# Patient Record
Sex: MALE | Race: WHITE | NOT HISPANIC OR LATINO | Employment: OTHER | URBAN - METROPOLITAN AREA
[De-identification: names, ages, dates, MRNs, and addresses within clinical notes are randomized per-mention and may not be internally consistent; named-entity substitution may affect disease eponyms.]

---

## 2017-03-10 ENCOUNTER — HOSPITAL ENCOUNTER (OUTPATIENT)
Dept: FAMILY MEDICINE CLINIC | Facility: CLINIC | Age: 82
Setting detail: SPECIMEN
Discharge: HOME | End: 2017-03-10
Attending: FAMILY MEDICINE | Admitting: FAMILY MEDICINE

## 2017-03-10 LAB
ALBUMIN SERPL-MCNC: 3.6 G/DL (ref 3.5–4.8)
ALBUMIN/GLOB SERPL: 1.2 {RATIO} (ref 1–1.7)
ALP SERPL-CCNC: 92 IU/L (ref 32–91)
ALT SERPL-CCNC: 16 IU/L (ref 17–63)
ANION GAP SERPL CALC-SCNC: 11.2 MMOL/L (ref 10–20)
AST SERPL-CCNC: 28 IU/L (ref 15–41)
BILIRUB SERPL-MCNC: 0.5 MG/DL (ref 0.3–1.2)
BUN SERPL-MCNC: 16 MG/DL (ref 8–20)
BUN/CREAT SERPL: 17.8 (ref 6.2–20.3)
CALCIUM SERPL-MCNC: 8.8 MG/DL (ref 8.9–10.3)
CHLORIDE SERPL-SCNC: 99 MMOL/L (ref 101–111)
CONV CO2: 27 MMOL/L (ref 22–32)
CONV POLYCHROMASIA IN BLOOD BY LIGHT MICROSCOPY: SLIGHT
CONV ROULEAUX IN BLOOD BY LIGHT MICROSCOPY: SLIGHT
CONV SMEAR REVIEW: (no result)
CONV TOTAL PROTEIN: 6.7 G/DL (ref 6.1–7.9)
CREAT UR-MCNC: 0.9 MG/DL (ref 0.7–1.2)
DIFFERENTIAL METHOD BLD: (no result)
EOSINOPHIL # BLD AUTO: 0.2 10*3/UL (ref 0–0.3)
EOSINOPHIL # BLD AUTO: 1 % (ref 0–3)
ERYTHROCYTE [DISTWIDTH] IN BLOOD BY AUTOMATED COUNT: 18.8 % (ref 11.5–14.5)
GLOBULIN UR ELPH-MCNC: 3.1 G/DL (ref 2.5–3.8)
GLUCOSE SERPL-MCNC: 94 MG/DL (ref 65–99)
HCT VFR BLD AUTO: 31.9 % (ref 40–54)
HGB BLD-MCNC: 10.9 G/DL (ref 14–18)
LYMPHOCYTES # BLD AUTO: 2.6 10*3/UL (ref 0.8–4.8)
LYMPHOCYTES NFR BLD AUTO: 14 % (ref 18–42)
MCH RBC QN AUTO: 35.9 PG (ref 26–32)
MCHC RBC AUTO-ENTMCNC: 34.3 G/DL (ref 32–36)
MCV RBC AUTO: 104.7 FL (ref 80–94)
MONOCYTES # BLD AUTO: 1.3 10*3/UL (ref 0.1–1.3)
MONOCYTES NFR BLD AUTO: 7 % (ref 2–11)
NEUTROPHILS # BLD AUTO: 14.3 10*3/UL (ref 2.3–8.6)
NEUTROPHILS NFR BLD AUTO: 78 % (ref 50–75)
PLATELET # BLD AUTO: 387 10*3/UL (ref 150–450)
PMV BLD AUTO: 8.8 FL (ref 7.4–10.4)
POTASSIUM SERPL-SCNC: 4.2 MMOL/L (ref 3.6–5.1)
RBC # BLD AUTO: 3.04 10*6/UL (ref 4.6–6)
SODIUM SERPL-SCNC: 133 MMOL/L (ref 136–144)
WBC # BLD AUTO: 18.4 10*3/UL (ref 4.5–11.5)

## 2017-06-29 ENCOUNTER — HOSPITAL ENCOUNTER (OUTPATIENT)
Dept: FAMILY MEDICINE CLINIC | Facility: CLINIC | Age: 82
Setting detail: SPECIMEN
Discharge: HOME | End: 2017-06-29
Attending: FAMILY MEDICINE | Admitting: FAMILY MEDICINE

## 2017-06-29 LAB
ALBUMIN SERPL-MCNC: 3.7 G/DL (ref 3.5–4.8)
ALBUMIN/GLOB SERPL: 1.2 {RATIO} (ref 1–1.7)
ALP SERPL-CCNC: 68 IU/L (ref 32–91)
ALT SERPL-CCNC: 16 IU/L (ref 17–63)
ANION GAP SERPL CALC-SCNC: 11 MMOL/L (ref 10–20)
AST SERPL-CCNC: 25 IU/L (ref 15–41)
BASOPHILS # BLD AUTO: 0.1 10*3/UL (ref 0–0.2)
BASOPHILS NFR BLD AUTO: 1 % (ref 0–2)
BILIRUB SERPL-MCNC: 0.6 MG/DL (ref 0.3–1.2)
BUN SERPL-MCNC: 16 MG/DL (ref 8–20)
BUN/CREAT SERPL: 16 (ref 6.2–20.3)
CALCIUM SERPL-MCNC: 9.1 MG/DL (ref 8.9–10.3)
CHLORIDE SERPL-SCNC: 100 MMOL/L (ref 101–111)
CHOLEST SERPL-MCNC: 112 MG/DL
CHOLEST/HDLC SERPL: 3 {RATIO}
CONV CO2: 26 MMOL/L (ref 22–32)
CONV LDL CHOLESTEROL DIRECT: 61 MG/DL (ref 0–100)
CONV TOTAL PROTEIN: 6.7 G/DL (ref 6.1–7.9)
CREAT UR-MCNC: 1 MG/DL (ref 0.7–1.2)
DIFFERENTIAL METHOD BLD: (no result)
EOSINOPHIL # BLD AUTO: 0.2 10*3/UL (ref 0–0.3)
EOSINOPHIL # BLD AUTO: 1 % (ref 0–3)
ERYTHROCYTE [DISTWIDTH] IN BLOOD BY AUTOMATED COUNT: 17.6 % (ref 11.5–14.5)
GLOBULIN UR ELPH-MCNC: 3 G/DL (ref 2.5–3.8)
GLUCOSE SERPL-MCNC: 106 MG/DL (ref 65–99)
HCT VFR BLD AUTO: 30.4 % (ref 40–54)
HDLC SERPL-MCNC: 37 MG/DL
HGB BLD-MCNC: 10.3 G/DL (ref 14–18)
LDLC/HDLC SERPL: 1.6 {RATIO}
LIPID INTERPRETATION: ABNORMAL
LYMPHOCYTES # BLD AUTO: 1.7 10*3/UL (ref 0.8–4.8)
LYMPHOCYTES NFR BLD AUTO: 10 % (ref 18–42)
MCH RBC QN AUTO: 36.6 PG (ref 26–32)
MCHC RBC AUTO-ENTMCNC: 33.8 G/DL (ref 32–36)
MCV RBC AUTO: 108.4 FL (ref 80–94)
MONOCYTES # BLD AUTO: 1.1 10*3/UL (ref 0.1–1.3)
MONOCYTES NFR BLD AUTO: 6 % (ref 2–11)
NEUTROPHILS # BLD AUTO: 13.9 10*3/UL (ref 2.3–8.6)
NEUTROPHILS NFR BLD AUTO: 82 % (ref 50–75)
NRBC BLD AUTO-RTO: 0 /100{WBCS}
NRBC/RBC NFR BLD MANUAL: 0 10*3/UL
PLATELET # BLD AUTO: 366 10*3/UL (ref 150–450)
PMV BLD AUTO: 9 FL (ref 7.4–10.4)
POTASSIUM SERPL-SCNC: 4 MMOL/L (ref 3.6–5.1)
PSA SERPL-MCNC: 5.24 NG/ML (ref 0–4)
RBC # BLD AUTO: 2.8 10*6/UL (ref 4.6–6)
SODIUM SERPL-SCNC: 133 MMOL/L (ref 136–144)
TRIGL SERPL-MCNC: 94 MG/DL
VLDLC SERPL CALC-MCNC: 13.9 MG/DL
WBC # BLD AUTO: 16.9 10*3/UL (ref 4.5–11.5)

## 2019-08-22 ENCOUNTER — OFFICE VISIT (OUTPATIENT)
Dept: FAMILY MEDICINE CLINIC | Facility: CLINIC | Age: 84
End: 2019-08-22

## 2019-08-22 VITALS
DIASTOLIC BLOOD PRESSURE: 62 MMHG | HEIGHT: 66 IN | RESPIRATION RATE: 14 BRPM | WEIGHT: 142 LBS | HEART RATE: 82 BPM | TEMPERATURE: 97.5 F | OXYGEN SATURATION: 97 % | BODY MASS INDEX: 22.82 KG/M2 | SYSTOLIC BLOOD PRESSURE: 108 MMHG

## 2019-08-22 DIAGNOSIS — F33.41 RECURRENT MAJOR DEPRESSIVE DISORDER, IN PARTIAL REMISSION (HCC): ICD-10-CM

## 2019-08-22 DIAGNOSIS — C61 MALIGNANT NEOPLASM OF PROSTATE (HCC): ICD-10-CM

## 2019-08-22 DIAGNOSIS — K21.9 GASTROESOPHAGEAL REFLUX DISEASE WITHOUT ESOPHAGITIS: ICD-10-CM

## 2019-08-22 DIAGNOSIS — E78.5 HYPERLIPIDEMIA, UNSPECIFIED HYPERLIPIDEMIA TYPE: ICD-10-CM

## 2019-08-22 DIAGNOSIS — J30.9 ALLERGIC RHINITIS, UNSPECIFIED SEASONALITY, UNSPECIFIED TRIGGER: Primary | ICD-10-CM

## 2019-08-22 DIAGNOSIS — D46.9 MYELODYSPLASTIC SYNDROME (HCC): ICD-10-CM

## 2019-08-22 PROBLEM — M81.0 OSTEOPOROSIS: Status: ACTIVE | Noted: 2018-04-20

## 2019-08-22 PROCEDURE — 99214 OFFICE O/P EST MOD 30 MIN: CPT | Performed by: FAMILY MEDICINE

## 2019-08-22 RX ORDER — FINASTERIDE 5 MG/1
TABLET, FILM COATED ORAL
COMMUNITY
Start: 2012-01-10 | End: 2020-01-01

## 2019-08-22 RX ORDER — PANTOPRAZOLE SODIUM 40 MG/1
40 TABLET, DELAYED RELEASE ORAL DAILY
COMMUNITY
Start: 2014-08-01

## 2019-08-22 RX ORDER — LORATADINE 10 MG/1
TABLET ORAL
COMMUNITY
Start: 2015-06-15 | End: 2020-01-01

## 2019-08-22 RX ORDER — SIMVASTATIN 10 MG
10 TABLET ORAL DAILY
COMMUNITY
Start: 2014-04-29

## 2019-08-22 RX ORDER — HYDROXYUREA 500 MG/1
500 CAPSULE ORAL DAILY
COMMUNITY
Start: 2017-10-12 | End: 2020-01-01

## 2019-08-22 RX ORDER — SENNOSIDES 8.6 MG
1300 CAPSULE ORAL DAILY
COMMUNITY

## 2019-08-22 RX ORDER — AZELASTINE HCL 205.5 UG/1
2 SPRAY NASAL DAILY
COMMUNITY
End: 2020-01-01

## 2019-08-22 NOTE — PROGRESS NOTES
"Subjective   Den Vinson is a 83 y.o. male.     Chief Complaint   Patient presents with   • Depression     3 MTH F/U   • Hyperlipidemia       HPI  Chief complaint: Allergic rhinitis depression hyper lipidemia osteoporosis gas receptor reflux disease prostate cancer MDS    The patient is a 83-year-old white male comes in for follow-up and maintenance of his current problems which include    1.  Allergic rhinitis-stable-patient on Flonase and antihistamines.  Nasal congestion cough shortness breath or wheezing.    2 hyperlipidemia-stable-patient is on Zocor 20 mg daily.  No myalgias arthralgias.  No nausea or anorexia.      3. gastroesophageal reflux disease-stable-patient on proton pump inhibitor.  Denies dysphagia or heartburn.    4.  Depression-stable-patient on citalopram 20 mg daily.  He denies anxiousness agitation depressed or suicidal thoughts.    5.  Prostate cancer-stable-patient is on Eligard.  There is no evidence of recurrence or metastatic disease.  Patient also takes doxazosin.  6.  Osteoporosis-stable-patient on Prolia and calcium vitamin D.  Patient denied bone pain or fractures.      7.  Myelodysplastic syndrome-stable-patient is on hydroxyurea.  Patient has chronic low-grade anemia.  Patient is transfused for hemoglobin less than 7.  Complains of tiredness and fatigue.    The following portions of the patient's history were reviewed and updated as appropriate: allergies, current medications, past family history, past medical history, past social history, past surgical history and problem list.    Review of Systems    Objective     /62 (BP Location: Right arm, Patient Position: Sitting, Cuff Size: Adult)   Pulse 82   Temp 97.5 °F (36.4 °C) (Oral)   Resp 14   Ht 167.6 cm (66\")   Wt 64.4 kg (142 lb)   SpO2 97%   BMI 22.92 kg/m²     Physical Exam   Constitutional: He is oriented to person, place, and time.   Elderly and frail   HENT:   Head: Normocephalic and atraumatic.   Eyes: " Conjunctivae and EOM are normal. Pupils are equal, round, and reactive to light.   Neck: Normal range of motion. Neck supple.   Cardiovascular: Normal rate, regular rhythm, normal heart sounds and intact distal pulses.   Pulmonary/Chest: Effort normal and breath sounds normal.   Abdominal: Soft. Bowel sounds are normal.   Musculoskeletal: Normal range of motion.   Neurological: He is alert and oriented to person, place, and time.   Skin: Skin is warm and dry.   Psychiatric: He has a normal mood and affect. His behavior is normal.   Nursing note and vitals reviewed.        Assessment/Plan   Den was seen today for depression and hyperlipidemia.    Diagnoses and all orders for this visit:    Allergic rhinitis, unspecified seasonality, unspecified trigger    Hyperlipidemia, unspecified hyperlipidemia type    Gastroesophageal reflux disease without esophagitis    Malignant neoplasm of prostate (CMS/HCC)    Myelodysplastic syndrome (CMS/HCC)    Recurrent major depressive disorder, in partial remission (CMS/HCC)      Patient Instructions   Continue your current medications and treatment.    Have the follow up labs done and call for results.    Follow up in the offe in 3 months.      Say Devries Jr., MD    08/22/19

## 2019-08-22 NOTE — PATIENT INSTRUCTIONS
Continue your current medications and treatment.    Have the follow up labs done and call for results.    Follow up in the offcie in 3 months.

## 2019-10-29 ENCOUNTER — OFFICE VISIT (OUTPATIENT)
Dept: FAMILY MEDICINE CLINIC | Facility: CLINIC | Age: 84
End: 2019-10-29

## 2019-10-29 ENCOUNTER — LAB (OUTPATIENT)
Dept: LAB | Facility: HOSPITAL | Age: 84
End: 2019-10-29

## 2019-10-29 VITALS
HEIGHT: 66 IN | SYSTOLIC BLOOD PRESSURE: 111 MMHG | WEIGHT: 142.8 LBS | BODY MASS INDEX: 22.95 KG/M2 | DIASTOLIC BLOOD PRESSURE: 67 MMHG | TEMPERATURE: 97.7 F | HEART RATE: 79 BPM | RESPIRATION RATE: 16 BRPM | OXYGEN SATURATION: 96 %

## 2019-10-29 DIAGNOSIS — R41.89 COGNITIVE IMPAIRMENT: ICD-10-CM

## 2019-10-29 DIAGNOSIS — Z00.00 ENCOUNTER FOR MEDICARE ANNUAL WELLNESS EXAM: Primary | ICD-10-CM

## 2019-10-29 LAB
ACANTHOCYTES BLD QL SMEAR: ABNORMAL
ANISOCYTOSIS BLD QL: ABNORMAL
BACTERIA UR QL AUTO: ABNORMAL /HPF
BASOPHILS # BLD MANUAL: 0.49 10*3/MM3 (ref 0–0.2)
BASOPHILS NFR BLD AUTO: 1 % (ref 0–1.5)
BILIRUB UR QL STRIP: NEGATIVE
CLARITY UR: ABNORMAL
COLOR UR: ABNORMAL
DACRYOCYTES BLD QL SMEAR: ABNORMAL
DEPRECATED RDW RBC AUTO: 55.4 FL (ref 37–54)
EOSINOPHIL # BLD MANUAL: 1.47 10*3/MM3 (ref 0–0.4)
EOSINOPHIL NFR BLD MANUAL: 3 % (ref 0.3–6.2)
ERYTHROCYTE [DISTWIDTH] IN BLOOD BY AUTOMATED COUNT: 16.7 % (ref 12.3–15.4)
FOLATE SERPL-MCNC: >20 NG/ML (ref 4.78–24.2)
GLUCOSE UR STRIP-MCNC: NEGATIVE MG/DL
HCT VFR BLD AUTO: 30.6 % (ref 37.5–51)
HGB BLD-MCNC: 9.8 G/DL (ref 13–17.7)
HGB UR QL STRIP.AUTO: NEGATIVE
HYALINE CASTS UR QL AUTO: ABNORMAL /LPF
HYPOCHROMIA BLD QL: ABNORMAL
KETONES UR QL STRIP: ABNORMAL
LEUKOCYTE ESTERASE UR QL STRIP.AUTO: NEGATIVE
LYMPHOCYTES # BLD MANUAL: 0.98 10*3/MM3 (ref 0.7–3.1)
LYMPHOCYTES NFR BLD MANUAL: 1 % (ref 5–12)
LYMPHOCYTES NFR BLD MANUAL: 2 % (ref 19.6–45.3)
MCH RBC QN AUTO: 29.3 PG (ref 26.6–33)
MCHC RBC AUTO-ENTMCNC: 32 G/DL (ref 31.5–35.7)
MCV RBC AUTO: 91.6 FL (ref 79–97)
METAMYELOCYTES NFR BLD MANUAL: 1 % (ref 0–0)
MICROCYTES BLD QL: ABNORMAL
MONOCYTES # BLD AUTO: 0.49 10*3/MM3 (ref 0.1–0.9)
MYELOCYTES NFR BLD MANUAL: 1 % (ref 0–0)
NEUTROPHILS # BLD AUTO: 44.43 10*3/MM3 (ref 1.7–7)
NEUTROPHILS NFR BLD MANUAL: 90.9 % (ref 42.7–76)
NEUTS VAC BLD QL SMEAR: ABNORMAL
NITRITE UR QL STRIP: NEGATIVE
OVALOCYTES BLD QL SMEAR: ABNORMAL
PH UR STRIP.AUTO: 5.5 [PH] (ref 5–8)
PLAT MORPH BLD: NORMAL
PLATELET # BLD AUTO: 385 10*3/MM3 (ref 140–450)
PMV BLD AUTO: 10.8 FL (ref 6–12)
POIKILOCYTOSIS BLD QL SMEAR: ABNORMAL
POLYCHROMASIA BLD QL SMEAR: ABNORMAL
PROT UR QL STRIP: ABNORMAL
RBC # BLD AUTO: 3.34 10*6/MM3 (ref 4.14–5.8)
RBC # UR: ABNORMAL /HPF
REF LAB TEST METHOD: ABNORMAL
SP GR UR STRIP: >=1.03 (ref 1–1.03)
SQUAMOUS #/AREA URNS HPF: ABNORMAL /HPF
TOXIC GRANULATION: ABNORMAL
TSH SERPL DL<=0.05 MIU/L-ACNC: 4.88 UIU/ML (ref 0.27–4.2)
UROBILINOGEN UR QL STRIP: ABNORMAL
VIT B12 BLD-MCNC: >2000 PG/ML (ref 211–946)
WBC NRBC COR # BLD: 48.88 10*3/MM3 (ref 3.4–10.8)
WBC UR QL AUTO: ABNORMAL /HPF

## 2019-10-29 PROCEDURE — 82607 VITAMIN B-12: CPT

## 2019-10-29 PROCEDURE — 81001 URINALYSIS AUTO W/SCOPE: CPT

## 2019-10-29 PROCEDURE — 36415 COLL VENOUS BLD VENIPUNCTURE: CPT

## 2019-10-29 PROCEDURE — 85025 COMPLETE CBC W/AUTO DIFF WBC: CPT

## 2019-10-29 PROCEDURE — 85007 BL SMEAR W/DIFF WBC COUNT: CPT

## 2019-10-29 PROCEDURE — G0438 PPPS, INITIAL VISIT: HCPCS | Performed by: FAMILY MEDICINE

## 2019-10-29 PROCEDURE — 84443 ASSAY THYROID STIM HORMONE: CPT

## 2019-10-29 PROCEDURE — 82746 ASSAY OF FOLIC ACID SERUM: CPT

## 2019-10-29 NOTE — PROGRESS NOTES
The ABCs of the Annual Wellness Visit  Initial Medicare Wellness Visit    Chief Complaint   Patient presents with   • Medicare Wellness-Initial Visit       Subjective   History of Present Illness:  Den Vinson is a 84 y.o. male who presents for an Initial Medicare Wellness Visit.    The patient is wife states he has had multiple problems with tiredness and fatigue.  Patient hematology states that his hemoglobin is better and should not be causing tiredness and fatigue.    Patient is wife states that he had increased problems with decision-making and confusion.    HEALTH RISK ASSESSMENT    Recent Hospitalizations:  No hospitalization(s) within the last year.    Current Medical Providers:  Patient Care Team:  Say Devries Jr., MD as PCP - General    Smoking Status:  Social History     Tobacco Use   Smoking Status Never Smoker   Smokeless Tobacco Never Used       Alcohol Consumption:  Social History     Substance and Sexual Activity   Alcohol Use No   • Frequency: Never       Depression Screen:   PHQ-2/PHQ-9 Depression Screening 10/29/2019   Little interest or pleasure in doing things 1   Feeling down, depressed, or hopeless 1   Trouble falling or staying asleep, or sleeping too much 1   Feeling tired or having little energy 2   Poor appetite or overeating 0   Feeling bad about yourself - or that you are a failure or have let yourself or your family down 1   Trouble concentrating on things, such as reading the newspaper or watching television 0   Moving or speaking so slowly that other people could have noticed. Or the opposite - being so fidgety or restless that you have been moving around a lot more than usual 0   Thoughts that you would be better off dead, or of hurting yourself in some way 0   Total Score 6       Fall Risk Screen:  STEADI Fall Risk Assessment has not been completed.    Health Habits and Functional and Cognitive Screening:  Functional & Cognitive Status 10/29/2019   Do you have difficulty  preparing food and eating? No   Do you have difficulty bathing yourself, getting dressed or grooming yourself? No   Do you have difficulty using the toilet? No   Do you have difficulty moving around from place to place? No   Do you have trouble with steps or getting out of a bed or a chair? No   Current Diet Well Balanced Diet   Dental Exam Up to date   Eye Exam Up to date   Exercise (times per week) 3 times per week   Current Exercise Activities Include Walking   Do you need help using the phone?  No   Are you deaf or do you have serious difficulty hearing?  Yes   Do you need help with transportation? No   Do you need help shopping? No   Do you need help preparing meals?  No   Do you need help with housework?  No   Do you need help with laundry? No   Do you need help taking your medications? No   Do you need help managing money? No   Do you ever drive or ride in a car without wearing a seat belt? No   Have you felt unusual stress, anger or loneliness in the last month? No   Who do you live with? Spouse   If you need help, do you have trouble finding someone available to you? No   Have you been bothered in the last four weeks by sexual problems? No   Do you have difficulty concentrating, remembering or making decisions? No         Does the patient have evidence of cognitive impairment? No    Asprin use counseling:Does not need ASA (and currently is not on it)    Age-appropriate Screening Schedule:  Refer to the list below for future screening recommendations based on patient's age, sex and/or medical conditions. Orders for these recommended tests are listed in the plan section. The patient has been provided with a written plan.    Health Maintenance   Topic Date Due   • TDAP/TD VACCINES (1 - Tdap) 09/06/1954   • ZOSTER VACCINE (1 of 2) 09/06/1987   • PNEUMOCOCCAL VACCINES (65+ LOW/MEDIUM RISK) (1 of 2 - PCV13) 09/06/2002   • INFLUENZA VACCINE  08/01/2019   • LIPID PANEL  08/22/2019   • DXA SCAN  08/22/2019           The following portions of the patient's history were reviewed and updated as appropriate: allergies, current medications, past family history, past medical history, past social history, past surgical history and problem list.    Outpatient Medications Prior to Visit   Medication Sig Dispense Refill   • acetaminophen (TYLENOL) 650 MG 8 hr tablet Take 1,300 mg by mouth Daily.     • aspirin 81 MG tablet Take 81 mg by mouth Daily.     • azelastine (ASTEPRO) 0.15 % solution nasal spray 2 sprays into the nostril(s) as directed by provider Daily.     • CITALOPRAM HYDROBROMIDE PO Take 20 mg by mouth Daily.     • degarelix (FIRMAGON) 120 MG chemo injection Inject  under the skin into the appropriate area as directed.     • denosumab (PROLIA) 60 MG/ML solution prefilled syringe syringe Inject 60 mg under the skin into the appropriate area as directed.     • DOXAZOSIN MESYLATE PO Take 8 mg by mouth Daily.     • finasteride (PROSCAR) 5 MG tablet FINASTERIDE 5 MG TABS     • hydroxyurea (HYDREA) 500 MG capsule Take 500 mg by mouth Daily.     • leuprolide (ELIGARD) 22.5 MG kit injection Inject 22.5 mg under the skin into the appropriate area as directed.     • loratadine (CLARITIN) 10 MG tablet CLARITIN 10 MG TABS     • pantoprazole (PROTONIX) 40 MG EC tablet Take 40 mg by mouth Daily.     • simvastatin (ZOCOR) 10 MG tablet Take 10 mg by mouth Daily.       No facility-administered medications prior to visit.        Patient Active Problem List   Diagnosis   • Allergic rhinitis   • Anemia   • Body mass index (BMI) of 22.0-22.9 in adult   • Depression   • Gastroesophageal reflux disease   • Hyperlipidemia   • JAK2 V617F mutation   • Malignant neoplasm of prostate (CMS/HCC)   • Myelodysplastic syndrome (CMS/HCC)   • Osteoporosis   • Vitamin D deficiency       Advanced Care Planning:  Patient has an advance directive - a copy has not been provided. Have asked the patient to send this to us to add to record    Review of  "Systems    Compared to one year ago, the patient feels his physical health is the same.  Compared to one year ago, the patient feels his mental health is the same.    Reviewed chart for potential of high risk medication in the elderly: yes  Reviewed chart for potential of harmful drug interactions in the elderly:yes    Objective         Vitals:    10/29/19 0952   BP: 111/67   BP Location: Right arm   Patient Position: Sitting   Cuff Size: Adult   Pulse: 79   Resp: 16   Temp: 97.7 °F (36.5 °C)   TempSrc: Oral   SpO2: 96%   Weight: 64.8 kg (142 lb 12.8 oz)   Height: 167.6 cm (66\")       Body mass index is 23.05 kg/m².  Discussed the patient's BMI with him. The BMI is in the acceptable range.    Physical Exam   Constitutional: He is oriented to person, place, and time.   Chronically ill appearing; pale   HENT:   Head: Normocephalic and atraumatic.   Right Ear: Decreased hearing is noted.   Left Ear: Decreased hearing is noted.   Eyes: EOM are normal. Pupils are equal, round, and reactive to light.   Neck: Neck supple.   Cardiovascular: Normal rate, regular rhythm, normal heart sounds and intact distal pulses.   Pulmonary/Chest: Effort normal and breath sounds normal.   Abdominal: Soft. Bowel sounds are normal.   Musculoskeletal: Normal range of motion.   Neurological: He is oriented to person, place, and time.   Skin: Skin is warm and dry.   Psychiatric: He has a normal mood and affect. His behavior is normal. Judgment and thought content normal.   Nursing note and vitals reviewed.      Lab Results   Component Value Date     (H) 10/04/2019        Assessment/Plan   Medicare Risks and Personalized Health Plan  CMS Preventative Services Quick Reference  Depression/Dysphoria  Immunizations Discussed/Encouraged (specific immunizations; Td, Influenza, Pneumococcal 23, Prevnar and Shingrix )    The above risks/problems have been discussed with the patient.  Pertinent information has been shared with the patient in the " After Visit Summary.  Follow up plans and orders are seen below in the Assessment/Plan Section.    There are no diagnoses linked to this encounter.  Follow Up:  No Follow-up on file.     An After Visit Summary and PPPS were given to the patient.

## 2019-11-05 ENCOUNTER — APPOINTMENT (OUTPATIENT)
Dept: MRI IMAGING | Facility: HOSPITAL | Age: 84
End: 2019-11-05

## 2019-11-07 ENCOUNTER — HOSPITAL ENCOUNTER (OUTPATIENT)
Dept: MRI IMAGING | Facility: HOSPITAL | Age: 84
Discharge: HOME | End: 2019-11-07
Admitting: FAMILY MEDICINE

## 2019-11-07 DIAGNOSIS — R41.89 COGNITIVE IMPAIRMENT: ICD-10-CM

## 2019-11-07 LAB — CREAT BLDA-MCNC: 1.2 MG/DL (ref 0.6–1.3)

## 2019-11-07 PROCEDURE — A9576 INJ PROHANCE MULTIPACK: HCPCS | Performed by: FAMILY MEDICINE

## 2019-11-07 PROCEDURE — 25010000002 GADOTERIDOL PER 1 ML: Performed by: FAMILY MEDICINE

## 2019-11-07 PROCEDURE — 70553 MRI BRAIN STEM W/O & W/DYE: CPT

## 2019-11-07 PROCEDURE — 82565 ASSAY OF CREATININE: CPT

## 2019-11-07 RX ADMIN — GADOTERIDOL 14 ML: 279.3 INJECTION, SOLUTION INTRAVENOUS at 11:30

## 2019-11-22 ENCOUNTER — OFFICE VISIT (OUTPATIENT)
Dept: FAMILY MEDICINE CLINIC | Facility: CLINIC | Age: 84
End: 2019-11-22

## 2019-11-22 VITALS
OXYGEN SATURATION: 97 % | BODY MASS INDEX: 23.27 KG/M2 | SYSTOLIC BLOOD PRESSURE: 124 MMHG | HEART RATE: 79 BPM | TEMPERATURE: 97.7 F | HEIGHT: 66 IN | DIASTOLIC BLOOD PRESSURE: 71 MMHG | RESPIRATION RATE: 16 BRPM | WEIGHT: 144.8 LBS

## 2019-11-22 DIAGNOSIS — R41.89 COGNITIVE IMPAIRMENT: ICD-10-CM

## 2019-11-22 DIAGNOSIS — E78.5 HYPERLIPIDEMIA, UNSPECIFIED HYPERLIPIDEMIA TYPE: ICD-10-CM

## 2019-11-22 DIAGNOSIS — J30.9 ALLERGIC RHINITIS, UNSPECIFIED SEASONALITY, UNSPECIFIED TRIGGER: ICD-10-CM

## 2019-11-22 DIAGNOSIS — K21.9 GASTROESOPHAGEAL REFLUX DISEASE WITHOUT ESOPHAGITIS: ICD-10-CM

## 2019-11-22 DIAGNOSIS — C61 MALIGNANT NEOPLASM OF PROSTATE (HCC): ICD-10-CM

## 2019-11-22 DIAGNOSIS — F33.41 RECURRENT MAJOR DEPRESSIVE DISORDER, IN PARTIAL REMISSION (HCC): ICD-10-CM

## 2019-11-22 DIAGNOSIS — D46.9 MYELODYSPLASTIC SYNDROME (HCC): Primary | ICD-10-CM

## 2019-11-22 PROCEDURE — 99214 OFFICE O/P EST MOD 30 MIN: CPT | Performed by: FAMILY MEDICINE

## 2019-11-22 NOTE — PROGRESS NOTES
Subjective   Den Vinson is a 84 y.o. male.     Chief Complaint   Patient presents with   • Depression     3 MTH F/U   • Hyperlipidemia       HPI chief complaint: Myelodysplastic syndrome cancer the prostate depression cognitive impairment hyperlipidemia allergic rhinitis gastroesophageal reflux disease    The patient is a 84-year-old white male comes in for follow-up and maintenance of his current problems which include    1.  Myelodysplastic syndrome-gradually deteriorating-patient denied fever chills or night sweats.  Patient is on hydroxyurea.  Patient sees hematology on a regular basis.  This process is slowly progressive.  Patient does have chronic anemia.    2.  Prostate cancer-stable-patient is on injections on a regular basis.  She is also on Proscar 5 mg once a day and Prolia.  There is no evidence that the cancer is progressing or recurring.  He sees urology on a regular basis.    3.  Depression-unchanged-patient taking citalopram 20 mg daily and hydroxyzine for anxiety.  Wife reports that he becomes very anxious when she is not around.  He denied suicidal homicidal thoughts.  He does worry constantly.    4.  Cognitive impairment-deteriorating-patient is currently on Aricept.  Patient cognitive impairment is gradually progressing to the point that he gets lost periodically.  His wife states that he gets very anxious when she is not around.  She states she is finding it difficult to be around all the time.  She was advised that this is a progressive process and some accommodations regarding need to be made for his safety.    5.  Hyperlipidemia-stable-patient is on Zocor 10 mg daily.  Denies myalgias and arthralgias.    6.  Gastroesophageal reflux disease-stable-patient on Protonix 40 mg daily.  Denies dysphagia or heartburn.    7.  Allergic Rhinitis-stable-patient is currently on antihistamines and inhaled anticholinergics.    The following portions of the patient's history were reviewed and updated as  "appropriate: allergies, current medications, past family history, past medical history, past social history, past surgical history and problem list.    Review of Systems    Objective     /71 (BP Location: Right arm, Patient Position: Sitting, Cuff Size: Adult)   Pulse 79   Temp 97.7 °F (36.5 °C) (Oral)   Resp 16   Ht 167.6 cm (66\")   Wt 65.7 kg (144 lb 12.8 oz)   SpO2 97%   BMI 23.37 kg/m²     Physical Exam   Constitutional: He is oriented to person, place, and time.   HENT:   Head: Normocephalic and atraumatic.   Right Ear: Decreased hearing is noted.   Left Ear: Decreased hearing is noted.   Eyes: Conjunctivae and EOM are normal. Pupils are equal, round, and reactive to light.   Neck: Normal range of motion. Neck supple.   Cardiovascular: Normal rate, regular rhythm, normal heart sounds and intact distal pulses.   Pulmonary/Chest: Effort normal and breath sounds normal.   Abdominal: Soft. Bowel sounds are normal.   Musculoskeletal: Normal range of motion.   Neurological: He is alert and oriented to person, place, and time.   Skin: Skin is warm and dry.   Psychiatric: He has a normal mood and affect. His behavior is normal.   Nursing note and vitals reviewed.        Assessment/Plan   Den was seen today for depression and hyperlipidemia.    Diagnoses and all orders for this visit:    Myelodysplastic syndrome (CMS/HCC)    Malignant neoplasm of prostate (CMS/HCC)    Recurrent major depressive disorder, in partial remission (CMS/HCC)    Cognitive impairment    Hyperlipidemia, unspecified hyperlipidemia type    Allergic rhinitis, unspecified seasonality, unspecified trigger    Gastroesophageal reflux disease without esophagitis      Patient Instructions   Continue your current medications and treatment.    Follow up in the office in 3 months.    Laboratory testing at that time.      Say Devries Jr., MD    11/22/19  "

## 2020-01-01 ENCOUNTER — OFFICE VISIT (OUTPATIENT)
Dept: FAMILY MEDICINE CLINIC | Facility: CLINIC | Age: 85
End: 2020-01-01

## 2020-01-01 ENCOUNTER — TELEPHONE (OUTPATIENT)
Dept: FAMILY MEDICINE CLINIC | Facility: CLINIC | Age: 85
End: 2020-01-01

## 2020-01-01 ENCOUNTER — LAB (OUTPATIENT)
Dept: LAB | Facility: HOSPITAL | Age: 85
End: 2020-01-01

## 2020-01-01 ENCOUNTER — HOSPITAL ENCOUNTER (OUTPATIENT)
Dept: NUCLEAR MEDICINE | Facility: HOSPITAL | Age: 85
Discharge: HOME | End: 2020-08-10

## 2020-01-01 ENCOUNTER — TELEMEDICINE (OUTPATIENT)
Dept: FAMILY MEDICINE CLINIC | Facility: CLINIC | Age: 85
End: 2020-01-01

## 2020-01-01 VITALS
RESPIRATION RATE: 20 BRPM | HEART RATE: 90 BPM | OXYGEN SATURATION: 95 % | WEIGHT: 139.4 LBS | HEIGHT: 66 IN | DIASTOLIC BLOOD PRESSURE: 61 MMHG | BODY MASS INDEX: 22.4 KG/M2 | SYSTOLIC BLOOD PRESSURE: 96 MMHG

## 2020-01-01 VITALS
TEMPERATURE: 97.6 F | HEIGHT: 66 IN | SYSTOLIC BLOOD PRESSURE: 97 MMHG | DIASTOLIC BLOOD PRESSURE: 61 MMHG | HEART RATE: 85 BPM | BODY MASS INDEX: 23.05 KG/M2 | OXYGEN SATURATION: 96 % | RESPIRATION RATE: 18 BRPM | WEIGHT: 143.4 LBS

## 2020-01-01 VITALS
HEART RATE: 73 BPM | SYSTOLIC BLOOD PRESSURE: 117 MMHG | RESPIRATION RATE: 20 BRPM | OXYGEN SATURATION: 99 % | BODY MASS INDEX: 21.47 KG/M2 | WEIGHT: 133.6 LBS | HEIGHT: 66 IN | TEMPERATURE: 97 F | DIASTOLIC BLOOD PRESSURE: 71 MMHG

## 2020-01-01 VITALS
OXYGEN SATURATION: 99 % | BODY MASS INDEX: 21.95 KG/M2 | HEART RATE: 88 BPM | RESPIRATION RATE: 20 BRPM | HEIGHT: 66 IN | DIASTOLIC BLOOD PRESSURE: 68 MMHG | SYSTOLIC BLOOD PRESSURE: 112 MMHG | WEIGHT: 136.6 LBS | TEMPERATURE: 97.1 F

## 2020-01-01 VITALS — SYSTOLIC BLOOD PRESSURE: 121 MMHG | TEMPERATURE: 98.6 F | HEART RATE: 73 BPM | DIASTOLIC BLOOD PRESSURE: 55 MMHG

## 2020-01-01 VITALS — HEART RATE: 77 BPM | DIASTOLIC BLOOD PRESSURE: 72 MMHG | TEMPERATURE: 98.6 F | SYSTOLIC BLOOD PRESSURE: 133 MMHG

## 2020-01-01 DIAGNOSIS — R41.89 COGNITIVE IMPAIRMENT: ICD-10-CM

## 2020-01-01 DIAGNOSIS — C61 MALIGNANT NEOPLASM OF PROSTATE (HCC): ICD-10-CM

## 2020-01-01 DIAGNOSIS — K21.9 GASTROESOPHAGEAL REFLUX DISEASE WITHOUT ESOPHAGITIS: ICD-10-CM

## 2020-01-01 DIAGNOSIS — E03.8 SUBCLINICAL HYPOTHYROIDISM: ICD-10-CM

## 2020-01-01 DIAGNOSIS — L03.211 CELLULITIS OF FACE: Primary | ICD-10-CM

## 2020-01-01 DIAGNOSIS — F33.41 RECURRENT MAJOR DEPRESSIVE DISORDER, IN PARTIAL REMISSION (HCC): Primary | ICD-10-CM

## 2020-01-01 DIAGNOSIS — D46.9 MYELODYSPLASTIC SYNDROME (HCC): Primary | ICD-10-CM

## 2020-01-01 DIAGNOSIS — D46.9 MYELODYSPLASTIC SYNDROME (HCC): ICD-10-CM

## 2020-01-01 DIAGNOSIS — F33.41 RECURRENT MAJOR DEPRESSIVE DISORDER, IN PARTIAL REMISSION (HCC): ICD-10-CM

## 2020-01-01 DIAGNOSIS — E03.9 ACQUIRED HYPOTHYROIDISM: ICD-10-CM

## 2020-01-01 DIAGNOSIS — M81.0 OSTEOPOROSIS, UNSPECIFIED OSTEOPOROSIS TYPE, UNSPECIFIED PATHOLOGICAL FRACTURE PRESENCE: ICD-10-CM

## 2020-01-01 DIAGNOSIS — I95.9 HYPOTENSION, UNSPECIFIED HYPOTENSION TYPE: ICD-10-CM

## 2020-01-01 DIAGNOSIS — E03.9 ACQUIRED HYPOTHYROIDISM: Primary | ICD-10-CM

## 2020-01-01 DIAGNOSIS — E78.5 HYPERLIPIDEMIA, UNSPECIFIED HYPERLIPIDEMIA TYPE: ICD-10-CM

## 2020-01-01 DIAGNOSIS — Z00.00 ENCOUNTER FOR ANNUAL WELLNESS EXAM IN MEDICARE PATIENT: ICD-10-CM

## 2020-01-01 LAB
ALBUMIN SERPL-MCNC: 3.6 G/DL (ref 3.5–5.2)
ALBUMIN/GLOB SERPL: 1 G/DL
ALP SERPL-CCNC: 278 U/L (ref 39–117)
ALT SERPL W P-5'-P-CCNC: 11 U/L (ref 1–41)
ANION GAP SERPL CALCULATED.3IONS-SCNC: 10.5 MMOL/L (ref 5–15)
AST SERPL-CCNC: 19 U/L (ref 1–40)
BILIRUB SERPL-MCNC: 0.4 MG/DL (ref 0–1.2)
BUN SERPL-MCNC: 35 MG/DL (ref 8–23)
BUN/CREAT SERPL: 22.7 (ref 7–25)
CALCIUM SPEC-SCNC: 9.3 MG/DL (ref 8.6–10.5)
CHLORIDE SERPL-SCNC: 99 MMOL/L (ref 98–107)
CHOLEST SERPL-MCNC: 113 MG/DL (ref 0–200)
CO2 SERPL-SCNC: 24.5 MMOL/L (ref 22–29)
CREAT SERPL-MCNC: 1.54 MG/DL (ref 0.76–1.27)
DEPRECATED RDW RBC AUTO: 55.7 FL (ref 37–54)
ERYTHROCYTE [DISTWIDTH] IN BLOOD BY AUTOMATED COUNT: 17.1 % (ref 12.3–15.4)
GFR SERPL CREATININE-BSD FRML MDRD: 43 ML/MIN/1.73
GLOBULIN UR ELPH-MCNC: 3.7 GM/DL
GLUCOSE SERPL-MCNC: 145 MG/DL (ref 65–99)
HCT VFR BLD AUTO: 25.8 % (ref 37.5–51)
HDLC SERPL-MCNC: 32 MG/DL (ref 40–60)
HGB BLD-MCNC: 8.3 G/DL (ref 13–17.7)
LDLC SERPL CALC-MCNC: 47 MG/DL (ref 0–100)
LDLC/HDLC SERPL: 1.48 {RATIO}
LYMPHOCYTES # BLD MANUAL: 3.98 10*3/MM3 (ref 0.7–3.1)
LYMPHOCYTES NFR BLD MANUAL: 2 % (ref 5–12)
LYMPHOCYTES NFR BLD MANUAL: 5 % (ref 19.6–45.3)
MCH RBC QN AUTO: 29.1 PG (ref 26.6–33)
MCHC RBC AUTO-ENTMCNC: 32.2 G/DL (ref 31.5–35.7)
MCV RBC AUTO: 90.5 FL (ref 79–97)
METAMYELOCYTES NFR BLD MANUAL: 1 % (ref 0–0)
MONOCYTES # BLD AUTO: 1.59 10*3/MM3 (ref 0.1–0.9)
MYELOCYTES NFR BLD MANUAL: 1 % (ref 0–0)
NEUTROPHILS # BLD AUTO: 72.47 10*3/MM3 (ref 1.7–7)
NEUTROPHILS NFR BLD MANUAL: 91 % (ref 42.7–76)
NRBC SPEC MANUAL: 1 /100 WBC (ref 0–0.2)
PLAT MORPH BLD: NORMAL
PLATELET # BLD AUTO: 388 10*3/MM3 (ref 140–450)
PMV BLD AUTO: 11.6 FL (ref 6–12)
POTASSIUM SERPL-SCNC: 4.6 MMOL/L (ref 3.5–5.2)
PROT SERPL-MCNC: 7.3 G/DL (ref 6–8.5)
RBC # BLD AUTO: 2.85 10*6/MM3 (ref 4.14–5.8)
RBC MORPH BLD: NORMAL
SODIUM SERPL-SCNC: 134 MMOL/L (ref 136–145)
T3FREE SERPL-MCNC: 2.32 PG/ML (ref 2–4.4)
T4 FREE SERPL-MCNC: 1.24 NG/DL (ref 0.93–1.7)
TRIGL SERPL-MCNC: 168 MG/DL (ref 0–150)
TSH SERPL DL<=0.05 MIU/L-ACNC: 3.06 UIU/ML (ref 0.27–4.2)
TSH SERPL DL<=0.05 MIU/L-ACNC: 4.48 UIU/ML (ref 0.27–4.2)
VLDLC SERPL-MCNC: 33.6 MG/DL (ref 5–40)
WBC # BLD AUTO: 79.64 10*3/MM3 (ref 3.4–10.8)
WBC MORPH BLD: NORMAL

## 2020-01-01 PROCEDURE — 78306 BONE IMAGING WHOLE BODY: CPT

## 2020-01-01 PROCEDURE — 85007 BL SMEAR W/DIFF WBC COUNT: CPT

## 2020-01-01 PROCEDURE — 80053 COMPREHEN METABOLIC PANEL: CPT

## 2020-01-01 PROCEDURE — 99214 OFFICE O/P EST MOD 30 MIN: CPT | Performed by: FAMILY MEDICINE

## 2020-01-01 PROCEDURE — 84481 FREE ASSAY (FT-3): CPT

## 2020-01-01 PROCEDURE — 85025 COMPLETE CBC W/AUTO DIFF WBC: CPT

## 2020-01-01 PROCEDURE — G0439 PPPS, SUBSEQ VISIT: HCPCS | Performed by: FAMILY MEDICINE

## 2020-01-01 PROCEDURE — 84443 ASSAY THYROID STIM HORMONE: CPT

## 2020-01-01 PROCEDURE — 84439 ASSAY OF FREE THYROXINE: CPT

## 2020-01-01 PROCEDURE — 99213 OFFICE O/P EST LOW 20 MIN: CPT | Performed by: FAMILY MEDICINE

## 2020-01-01 PROCEDURE — 36415 COLL VENOUS BLD VENIPUNCTURE: CPT

## 2020-01-01 PROCEDURE — A9503 TC99M MEDRONATE: HCPCS | Performed by: FAMILY MEDICINE

## 2020-01-01 PROCEDURE — 80061 LIPID PANEL: CPT

## 2020-01-01 PROCEDURE — 0 TECHNETIUM MEDRONATE KIT: Performed by: FAMILY MEDICINE

## 2020-01-01 RX ORDER — DONEPEZIL HYDROCHLORIDE 5 MG/1
5 TABLET, FILM COATED ORAL
COMMUNITY
Start: 2020-01-01 | End: 2020-01-01

## 2020-01-01 RX ORDER — TC 99M MEDRONATE 20 MG/10ML
25 INJECTION, POWDER, LYOPHILIZED, FOR SOLUTION INTRAVENOUS
Status: DISCONTINUED | OUTPATIENT
Start: 2020-01-01 | End: 2020-01-01 | Stop reason: HOSPADM

## 2020-01-01 RX ORDER — CEPHALEXIN 500 MG/1
500 CAPSULE ORAL 3 TIMES DAILY
Qty: 15 CAPSULE | Refills: 0 | Status: SHIPPED | OUTPATIENT
Start: 2020-01-01 | End: 2020-01-01

## 2020-01-01 RX ORDER — TC 99M MEDRONATE 20 MG/10ML
25 INJECTION, POWDER, LYOPHILIZED, FOR SOLUTION INTRAVENOUS
Status: COMPLETED | OUTPATIENT
Start: 2020-01-01 | End: 2020-01-01

## 2020-01-01 RX ORDER — CEPHALEXIN 500 MG/1
500 CAPSULE ORAL 3 TIMES DAILY
Qty: 30 CAPSULE | Refills: 0 | Status: SHIPPED | OUTPATIENT
Start: 2020-01-01 | End: 2020-01-01 | Stop reason: SDUPTHER

## 2020-01-01 RX ADMIN — TC 99M MEDRONATE 25 MILLICURIE: 20 INJECTION, POWDER, LYOPHILIZED, FOR SOLUTION INTRAVENOUS at 10:30

## 2020-01-15 ENCOUNTER — OFFICE VISIT (OUTPATIENT)
Dept: FAMILY MEDICINE CLINIC | Facility: CLINIC | Age: 85
End: 2020-01-15

## 2020-01-15 VITALS
OXYGEN SATURATION: 99 % | BODY MASS INDEX: 23.18 KG/M2 | SYSTOLIC BLOOD PRESSURE: 113 MMHG | HEIGHT: 66 IN | RESPIRATION RATE: 20 BRPM | HEART RATE: 78 BPM | TEMPERATURE: 97.7 F | DIASTOLIC BLOOD PRESSURE: 70 MMHG | WEIGHT: 144.2 LBS

## 2020-01-15 DIAGNOSIS — R05.9 COUGH: Primary | ICD-10-CM

## 2020-01-15 PROCEDURE — 99212 OFFICE O/P EST SF 10 MIN: CPT | Performed by: FAMILY MEDICINE

## 2020-01-15 NOTE — PROGRESS NOTES
"Subjective   Den Vinson is a 84 y.o. male.     Chief Complaint   Patient presents with   • Wheezing     follow up from [facility]  Chief complaint: Wheezing    The patient is an 84-year-old white male who [facility]  On recent exam the doctor heard some wheezing.  She recommended a chest x-ray.  Patient's did not want to do so.  They wanted to be evaluated by me.  Patient denied fever or chills.  He denied sinus congestion drainage drainage sore throat cough.  He denied nausea or vomiting or diarrhea.        The following portions of the patient's history were reviewed and updated as appropriate: allergies, current medications, past family history, past medical history, past social history, past surgical history and problem list.    Review of Systems    Objective     /70 (BP Location: Left arm, Patient Position: Sitting, Cuff Size: Adult)   Pulse 78   Temp 97.7 °F (36.5 °C) (Oral)   Resp 20   Ht 167.6 cm (66\")   Wt 65.4 kg (144 lb 3.2 oz)   SpO2 99%   BMI 23.27 kg/m²     Physical Exam   Constitutional: He is oriented to person, place, and time. He appears well-developed and well-nourished.   HENT:   Head: Normocephalic and atraumatic.   Right Ear: Decreased hearing is noted.   Left Ear: Decreased hearing is noted.   Eyes: Pupils are equal, round, and reactive to light. Conjunctivae and EOM are normal.   Neck: Normal range of motion. Neck supple.   Cardiovascular: Normal rate, regular rhythm, normal heart sounds and intact distal pulses.   Pulmonary/Chest: Effort normal and breath sounds normal.   Abdominal: Soft. Bowel sounds are normal.   Musculoskeletal: Normal range of motion.   Neurological: He is alert and oriented to person, place, and time.   Skin: Skin is warm and dry.   Psychiatric: He has a normal mood and affect. His behavior is normal.   Nursing note and vitals reviewed.    Patient was advised that on my clinical exam I could not hear any " wheezing or other symptoms of respiratory infection.  Offered to do the testing.  Patient and family opted not to.    Assessment/Plan   Den was seen today for wheezing.    Diagnoses and all orders for this visit:    Cough      Patient Instructions   Continue your currentr medications and treatment.    Call if you develop fever, increased shortness of breath.    Follow up with me at your next appointment.          Say Devries Jr., MD    01/15/20

## 2020-01-15 NOTE — PATIENT INSTRUCTIONS
Continue your currentr medications and treatment.    Call if you develop fever, increased shortness of breath.    Follow up with me at your next appointment.

## 2020-01-16 ENCOUNTER — LAB (OUTPATIENT)
Dept: LAB | Facility: HOSPITAL | Age: 85
End: 2020-01-16

## 2020-01-16 ENCOUNTER — TRANSCRIBE ORDERS (OUTPATIENT)
Dept: ADMINISTRATIVE | Facility: HOSPITAL | Age: 85
End: 2020-01-16

## 2020-01-16 DIAGNOSIS — F03.90 SENILE DEMENTIA, UNCOMPLICATED (HCC): ICD-10-CM

## 2020-01-16 DIAGNOSIS — F03.90 SENILE DEMENTIA, UNCOMPLICATED (HCC): Primary | ICD-10-CM

## 2020-01-16 LAB
CHROMATIN AB SERPL-ACNC: 33.5 IU/ML (ref 0–14)
FOLATE SERPL-MCNC: >20 NG/ML (ref 4.78–24.2)
RPR SER QL: NORMAL
TSH SERPL DL<=0.05 MIU/L-ACNC: 5.56 UIU/ML (ref 0.27–4.2)
VIT B12 BLD-MCNC: >2000 PG/ML (ref 211–946)

## 2020-01-16 PROCEDURE — 84165 PROTEIN E-PHORESIS SERUM: CPT

## 2020-01-16 PROCEDURE — 82607 VITAMIN B-12: CPT

## 2020-01-16 PROCEDURE — 86235 NUCLEAR ANTIGEN ANTIBODY: CPT

## 2020-01-16 PROCEDURE — 86431 RHEUMATOID FACTOR QUANT: CPT

## 2020-01-16 PROCEDURE — 83516 IMMUNOASSAY NONANTIBODY: CPT

## 2020-01-16 PROCEDURE — 86592 SYPHILIS TEST NON-TREP QUAL: CPT

## 2020-01-16 PROCEDURE — 84155 ASSAY OF PROTEIN SERUM: CPT

## 2020-01-16 PROCEDURE — 36415 COLL VENOUS BLD VENIPUNCTURE: CPT

## 2020-01-16 PROCEDURE — 84443 ASSAY THYROID STIM HORMONE: CPT

## 2020-01-16 PROCEDURE — 86225 DNA ANTIBODY NATIVE: CPT

## 2020-01-16 PROCEDURE — 86038 ANTINUCLEAR ANTIBODIES: CPT

## 2020-01-16 PROCEDURE — 83921 ORGANIC ACID SINGLE QUANT: CPT

## 2020-01-16 PROCEDURE — 82746 ASSAY OF FOLIC ACID SERUM: CPT

## 2020-01-17 LAB
ALBUMIN SERPL-MCNC: 3.7 G/DL (ref 2.9–4.4)
ALBUMIN/GLOB SERPL: 1 {RATIO} (ref 0.7–1.7)
ALPHA1 GLOB FLD ELPH-MCNC: 0.3 G/DL (ref 0–0.4)
ALPHA2 GLOB SERPL ELPH-MCNC: 0.8 G/DL (ref 0.4–1)
ANA SER QL: POSITIVE
B-GLOBULIN SERPL ELPH-MCNC: 0.9 G/DL (ref 0.7–1.3)
GAMMA GLOB SERPL ELPH-MCNC: 1.7 G/DL (ref 0.4–1.8)
GLOBULIN SER CALC-MCNC: 3.7 G/DL (ref 2.2–3.9)
Lab: NORMAL
M-SPIKE: NORMAL G/DL
PROT SERPL-MCNC: 7.4 G/DL (ref 6–8.5)

## 2020-01-20 LAB
CENTROMERE B AB SER-ACNC: <0.2 AI (ref 0–0.9)
CHROMATIN AB SERPL-ACNC: <0.2 AI (ref 0–0.9)
DSDNA AB SER-ACNC: 1 IU/ML (ref 0–9)
ENA JO1 AB SER-ACNC: <0.2 AI (ref 0–0.9)
ENA RNP AB SER-ACNC: <0.2 AI (ref 0–0.9)
ENA SCL70 AB SER-ACNC: 0.6 AI (ref 0–0.9)
ENA SM AB SER-ACNC: <0.2 AI (ref 0–0.9)
ENA SS-A AB SER-ACNC: <0.2 AI (ref 0–0.9)
ENA SS-B AB SER-ACNC: 0.3 AI (ref 0–0.9)
Lab: NORMAL
RIBOSOMAL P AB SER-ACNC: <0.2 AI (ref 0–0.9)
RIBOSOMAL P AB SER-ACNC: <0.2 AI (ref 0–0.9)

## 2020-01-21 LAB
Lab: ABNORMAL
METHYLMALONATE SERPL-SCNC: 404 NMOL/L (ref 0–378)

## 2020-02-20 NOTE — TELEPHONE ENCOUNTER
TSH level came back as 5.56 and Dr. Roy would like you to manage this, their office is faxing his lab results over to us

## 2020-02-27 PROBLEM — E03.8 SUBCLINICAL HYPOTHYROIDISM: Status: ACTIVE | Noted: 2020-01-01

## 2020-02-27 PROBLEM — R05.9 COUGH: Status: RESOLVED | Noted: 2020-01-15 | Resolved: 2020-01-01

## 2020-02-27 NOTE — PROGRESS NOTES
Subjective   Den Vinson is a 84 y.o. male.     Chief Complaint   Patient presents with   • Depression     3 MTH F/U   • Hyperlipidemia       HPI  Chief complaint: Subclinical hypothyroidism depression hyperlipidemia gastroesophageal reflux disease prostate cancer    The patient is an 84-year-old white male comes in for follow-up and maintenance of his current problems which include    1.  Subclinical hypothyroidism-new-patient is been found to have a mildly elevated TSH.  Patient does not have other signs or symptoms of hypothyroidism.  Patient does have dementia.  Patient already takes several medications.  I recommended a T3 and a T4 prior to starting thyroid replacement therapy.    2.  Hyperlipidemia-stable-patient on Zocor 10 mg daily.  He denied anxiousness agitation depressed or suicidal thoughts.    3.  Dementia-stable-patient has been evaluated by neurology.  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  Is been placed on Aricept 5 mg daily.  Wife states that his dementia is about the same she is not noted any improvement.    4.  Allergic rhinitis-stable-patient is currently on Claritin and Astepro.  Denies sinus congestion drainage cough shortness of breath or wheezing.    5.  Prostate cancer-stable-patient is currently on Eligard .  No evidence of metastatic disease.    6.  MyeloDysplastic syndrome-stable-patient on hydroxyurea.  Hemoglobin hematocrit and white count are stable.  He does have chronic anemia.    7.  Osateoporosis-stable-patient is currently on Prolia and calcium vitamin D.  Denied bone pain or fractures.        The following portions of the patient's history were reviewed and updated as appropriate: allergies, current medications, past family history, past medical history, past social history, past surgical history and problem list.    Review of Systems    Objective     BP 97/61 (BP Location: Left arm, Patient Position: Sitting, Cuff Size: Adult)   Pulse 85   Temp 97.6 °F  "(36.4 °C) (Oral)   Resp 18   Ht 167.6 cm (66\")   Wt 65 kg (143 lb 6.4 oz)   SpO2 96%   BMI 23.15 kg/m²     Physical Exam   Constitutional: He is oriented to person, place, and time. He appears well-developed and well-nourished.   HENT:   Head: Normocephalic and atraumatic.   Right Ear: Decreased hearing is noted.   Left Ear: Decreased hearing is noted.   Eyes: Pupils are equal, round, and reactive to light. Conjunctivae and EOM are normal.   Neck: Normal range of motion. Neck supple.   Cardiovascular: Normal rate, regular rhythm, normal heart sounds and intact distal pulses.   Pulmonary/Chest: Effort normal and breath sounds normal.   Abdominal: Soft. Bowel sounds are normal.   Musculoskeletal: Normal range of motion.   Neurological: He is alert and oriented to person, place, and time.   Skin: Skin is warm and dry.   Psychiatric: He has a normal mood and affect. His behavior is normal.   Nursing note and vitals reviewed.        Assessment/Plan   Den was seen today for depression and hyperlipidemia.    Diagnoses and all orders for this visit:    Acquired hypothyroidism  -     Lipid Panel; Future  -     TSH; Future  -     T3, free; Future  -     T4, free; Future    Hyperlipidemia, unspecified hyperlipidemia type    Subclinical hypothyroidism    Malignant neoplasm of prostate (CMS/HCC)    Myelodysplastic syndrome (CMS/HCC)    Cognitive impairment      There are no Patient Instructions on file for this visit.    Say Devries Jr., MD    02/27/20  "

## 2020-04-13 PROBLEM — L03.211 CELLULITIS OF FACE: Status: ACTIVE | Noted: 2020-01-01

## 2020-04-13 NOTE — PATIENT INSTRUCTIONS
Take the anti-biotics as prescribed.    Continue your other medication and treatment.    Follow up in the office in 1 week.

## 2020-04-13 NOTE — PROGRESS NOTES
Subjective   Den Vinson is a 84 y.o. male.     Chief Complaint   Patient presents with   • Cellulitis       HPI  Chief complaint: Facial redness    The patient is an 84-year-old white male who was seen by video visit today.    The patient wife contacted me.  The patient is just recovering from a fever blister involving the left side of his mouth.  She states it was a bad episode.  She has been treating this with Ambigen and she thinks that it is finally resolving.  The patient however is developed redness in the left facial area from the corner of his mouth under his eye.  He denied fever or chills.  He denied pain.  She was wondering whether the patient had cellulitis.    The patient has multiple medical problems including chronic lymphocytic leukemia.        The following portions of the patient's history were reviewed and updated as appropriate: allergies, current medications, past family history, past medical history, past social history, past surgical history and problem list.    Review of Systems   Constitutional: Negative for chills and fever.   HENT: Negative for congestion, sinus pressure and swollen glands.         Redness of the left face from the left lip to the left infraorbital area   Eyes: Negative for blurred vision and pain.   Respiratory: Negative for cough and shortness of breath.    Cardiovascular: Negative for chest pain and leg swelling.   Gastrointestinal: Negative for abdominal pain, nausea and indigestion.   Endocrine: Negative for cold intolerance, heat intolerance, polydipsia, polyphagia and polyuria.   Skin: Negative for dry skin, rash and bruise.   Neurological: Negative for dizziness, syncope and headache.   Psychiatric/Behavioral: Negative for dysphoric mood and stress.       Objective     /72   Pulse 77   Temp 98.6 °F (37 °C)     Physical Exam   Constitutional: He appears well-developed and well-nourished.   HENT:   Redness of the left facial area in the maxillary sinus are  are   Nursing note and vitals reviewed.        Assessment/Plan   Den was seen today for cellulitis.    Diagnoses and all orders for this visit:    Cellulitis of face-the patient and his wife are advised that he may have cellulitis involving the left facial area.  I recommended a trial of Keflex. They are to follow-up with me in 1 week or sooner if needed.      Patient Instructions   Take the anti-biotics as prescribed.    Continue your other medication and treatment.    Follow up in the office in 1 week.      Say Devries Jr., MD    04/13/20

## 2020-04-22 NOTE — PROGRESS NOTES
Subjective   Den Vinson is a 84 y.o. male.     Chief Complaint   Patient presents with   • Cellulitis       HPI  Cheif complaint : Cellulitis, MDS    The patient is an 84-year-old white male was seen by video visit today.  The patient agreed to be seen by video visit.    The patient was seen in follow-up and maintenance of his current problems which include    1.  Cellulitis of the left face-improved-the patient was seen by me 1 to 2 weeks ago because of cellulitis of the left face that developed following a cold sore.  The patient was placed on Keflex.  Patient states he feels significantly better.  He states the redness on his face is almost totally gone.  He still has a small amount of redness left.  There is no fever chills.  Patient does not have tenderness.    2.  Myelodysplastic syndrome-stable-patient has history of mild dysplastic syndrome.  He has chronic anemia.  Patient is at risk for bacterial infections as result of this.     3.  Prostate cancer-stable-patient also has a history of prostate cancer and is on Firmagon injections.  The prostate cancer is controlled.  Is no evidence of metastatic disease or recurrence.        The following portions of the patient's history were reviewed and updated as appropriate: allergies, current medications, past family history, past medical history, past social history, past surgical history and problem list.    Review of Systems   Constitutional: Negative for chills and fever.   HENT: Negative for congestion, sinus pressure and swollen glands.    Eyes: Negative for blurred vision and pain.   Respiratory: Negative for cough and shortness of breath.    Cardiovascular: Negative for chest pain and leg swelling.   Gastrointestinal: Negative for abdominal pain, nausea and indigestion.   Endocrine: Negative for cold intolerance, heat intolerance, polydipsia, polyphagia and polyuria.   Skin: Negative for dry skin, rash and bruise.   Neurological: Positive for memory  problem. Negative for dizziness, syncope and headache.   Psychiatric/Behavioral: Negative for dysphoric mood and stress.       Objective     /55   Pulse 73   Temp 98.6 °F (37 °C)     Physical Exam   Constitutional: He appears well-developed and well-nourished.   HENT:   No erythema of the left face   Nursing note and vitals reviewed.        Assessment/Plan   Den was seen today for cellulitis.    Diagnoses and all orders for this visit:    Cellulitis of face    Myelodysplastic syndrome (CMS/HCC)    Malignant neoplasm of prostate (CMS/HCC)      Patient Instructions   Continue your current medications.      Take Keflex for another 5 days.    Follow up in the office in 3 months.      Say Devries Jr., MD    04/22/20

## 2020-04-22 NOTE — PATIENT INSTRUCTIONS
Continue your current medications.      Take Keflex for another 5 days.    Follow up in the office in 3 months.

## 2020-05-27 NOTE — TELEPHONE ENCOUNTER
I spoke to the patient's wife.  He has a skin rash on his legs.  He is not febrile.  He has an appointment with dermatology tomorrow.  He will have dermatology look at it.

## 2020-07-17 PROBLEM — F33.41 RECURRENT MAJOR DEPRESSIVE DISORDER, IN PARTIAL REMISSION (HCC): Status: ACTIVE | Noted: 2020-01-01

## 2020-07-17 PROBLEM — L03.211 CELLULITIS OF FACE: Status: RESOLVED | Noted: 2020-01-01 | Resolved: 2020-01-01

## 2020-07-17 NOTE — PATIENT INSTRUCTIONS
Stop the Aricept and the Doxazocin.    Have the follow up labs done and call for results.    Follow up in the office in 2 weeks.

## 2020-07-17 NOTE — PROGRESS NOTES
Subjective   Den Vinson is a 84 y.o. male.     Chief Complaint   Patient presents with   • Hypotension   • Depression   • Hyperlipidemia       HPI  Chief complaint: Hyperlipidemia hypothyroidism prostate cancer myelodysplastic syndrome    The patient is an 84-year-old white male comes in for follow-up and maintenance of his current problems which include    1.  Prostate cancer-stable-patient is currently on Eligard and Firmagon.  Patient got an injection 2 weeks ago.  Patient's wife states that he has been tired and fatigued ever since then.  She states that he sleeps all the time.  She states that when he has had these injections in the past he gets tiredness and fatigue until it is time for another 1.  He states that just prior to getting the follow-up injections his tiredness and fatigue improved.  He denied fever chills.  The patient is on doxazosin for prostate type symptoms.  Patient's wife states it is not helping.  She states he has frequent urination.    2.  Hyperlipidemia-stable-the patient is on Zocor 10 mg once a day.  He denies myalgias and arthralgias.  No nausea or anorexia.    3.  Depression-stable-patient on citalopram 20 mg daily.  Denies anxiousness agitation depressed or suicidal thoughts.    4.  Osteoporosis-stable-patient on Prolia.  He denied bone pain or fractures.    5.  Subclinical hypothyroidism-stable-patient on no no medications.  He does complain of some tiredness and fatigue.    6.  Gastroesophageal reflux disease-stable-patient is currently on Protonix.  He denies dysphagia or heartburn.    7.  Myelodysplastic syndrome/chronic lymphocytic leukemia-stable-patient has history of anemia and elevated white blood cell count.  His last hemoglobin 9.7.  Is not had a CBC since then.  Patient's wife states that he is having difficulty with tiredness and fatigue.    8. Dementia-deteriorated-patient on Aricept 5 mg daily.  Patient wife states that this is not helping his dementia.  I  "recommended stopping the medication.        The following portions of the patient's history were reviewed and updated as appropriate: allergies, current medications, past family history, past medical history, past social history, past surgical history and problem list.    Review of Systems    Objective     BP 96/61 (BP Location: Left arm, Patient Position: Sitting, Cuff Size: Adult)   Pulse 90   Resp 20   Ht 167.6 cm (66\")   Wt 63.2 kg (139 lb 6.4 oz)   SpO2 95%   BMI 22.50 kg/m²     Physical Exam   Constitutional: He is oriented to person, place, and time.   HENT:   Head: Normocephalic and atraumatic.   Eyes: Pupils are equal, round, and reactive to light. Conjunctivae and EOM are normal.   Neck: Normal range of motion. Neck supple.   Cardiovascular: Normal rate, regular rhythm, normal heart sounds and intact distal pulses.   Pulmonary/Chest: Effort normal and breath sounds normal.   Abdominal: Soft. Bowel sounds are normal.   Musculoskeletal: Normal range of motion.   Neurological: He is alert and oriented to person, place, and time.   Skin: Skin is warm and dry.   Psychiatric: He has a normal mood and affect. His behavior is normal.   Nursing note and vitals reviewed.        Assessment/Plan   Den was seen today for hypotension, depression and hyperlipidemia.    Diagnoses and all orders for this visit:    Myelodysplastic syndrome (CMS/HCC)  -     CBC & Differential; Future  -     Comprehensive Metabolic Panel; Future    Recurrent major depressive disorder, in partial remission (CMS/HCC)    Subclinical hypothyroidism  -     TSH; Future    Hyperlipidemia, unspecified hyperlipidemia type    Gastroesophageal reflux disease without esophagitis    Osteoporosis, unspecified osteoporosis type, unspecified pathological fracture presence    Malignant neoplasm of prostate (CMS/HCC)    Cognitive impairment      Patient Instructions   Stop the Aricept and the Doxazocin.    Have the follow up labs done and call for " results.    Follow up in the office in 2 weeks.      Say Devries Jr., MD    07/17/20

## 2020-07-31 NOTE — PROGRESS NOTES
"Subjective   Den Vinson is a 84 y.o. male.     Chief Complaint   Patient presents with   • Hypotension     2 week follow up   • Anemia   • Prostate Cancer       HPI  Chief complaint: Hypertension prostate cancer myelodysplastic syndrome    The patient is an 84-year-old white male who comes in for follow-up and maintenance of his current problems which include    1.  Hypotension-improved-patient was seen by me recently because of weakness.  This was found to be hypotensive.  His doxazosin and Aricept were stopped.  Patient states he feels better.  His blood pressure is improved.    2.  Myelodysplastic syndrome-stable-patient has a history of myelodysplastic syndrome.  The patient is not currently on medications.  Patient was found to have elevated white count worsening anemia recently.  He has been seen since then by his hematologist.  Bone marrow was done patient had an ultrasound of his spleen.  Results are pending.    3.  Prostate cancer-stable-patient has prostate cancer.  He is receiving Eligard.  He is due for a bone scan per request of his urologist.        The following portions of the patient's history were reviewed and updated as appropriate: allergies, current medications, past family history, past medical history, past social history, past surgical history and problem list.    Review of Systems    Objective     /71 (BP Location: Left arm, Patient Position: Sitting, Cuff Size: Adult)   Pulse 73   Temp 97 °F (36.1 °C) (Infrared)   Resp 20   Ht 167.6 cm (66\")   Wt 60.6 kg (133 lb 9.6 oz)   SpO2 99%   BMI 21.56 kg/m²     Physical Exam   Constitutional: He is oriented to person, place, and time.   HENT:   Head: Normocephalic and atraumatic.   Eyes: Pupils are equal, round, and reactive to light. Conjunctivae and EOM are normal.   Neck: Normal range of motion. Neck supple.   Cardiovascular: Normal rate, regular rhythm, normal heart sounds and intact distal pulses.   Pulmonary/Chest: Effort " normal and breath sounds normal.   Abdominal: Soft. Bowel sounds are normal.   Musculoskeletal: Normal range of motion.   Lymphadenopathy:        Head (right side): No submental, no submandibular, no tonsillar, no preauricular, no posterior auricular and no occipital adenopathy present.        Head (left side): No submental, no submandibular, no tonsillar, no preauricular, no posterior auricular and no occipital adenopathy present.     He has no cervical adenopathy.        Right cervical: No superficial cervical, no deep cervical and no posterior cervical adenopathy present.       Left cervical: No superficial cervical, no deep cervical and no posterior cervical adenopathy present.     He has no axillary adenopathy.        Right axillary: No pectoral and no lateral adenopathy present.        Left axillary: No pectoral and no lateral adenopathy present.       Right: No inguinal, no supraclavicular and no epitrochlear adenopathy present.        Left: No inguinal, no supraclavicular and no epitrochlear adenopathy present.   Neurological: He is alert and oriented to person, place, and time.   Skin: Skin is warm and dry.   Psychiatric: He has a normal mood and affect. His behavior is normal.   Nursing note and vitals reviewed.        Assessment/Plan   Den was seen today for hypotension, anemia and prostate cancer.    Diagnoses and all orders for this visit:    Myelodysplastic syndrome (CMS/HCC)    Hypotension, unspecified hypotension type    Malignant neoplasm of prostate (CMS/HCC)  -     NM Bone Scan 3 Ph Whole Body; Future      Patient Instructions   Continue your current medications and treatment.    Have the follow up bone scan.    Follow up in the office in 3 months.      Say Devries Jr., MD    07/31/20

## 2020-07-31 NOTE — PATIENT INSTRUCTIONS
Continue your current medications and treatment.    Have the follow up bone scan.    Follow up in the office in 3 months.

## 2020-11-02 NOTE — PROGRESS NOTES
The ABCs of the Annual Wellness Visit  Subsequent Medicare Wellness Visit    Chief Complaint   Patient presents with   • Medicare Wellness-subsequent   • Hyperlipidemia     3 month followup   • Hypothyroidism   • Depression   • Prostate Cancer   • Anemia       Subjective   History of Present Illness:  Den Vinson is a 85 y.o. male who presents for a Subsequent Medicare Wellness Visit.    HEALTH RISK ASSESSMENT    Recent Hospitalizations:  No hospitalization(s) within the last year.    Current Medical Providers:  Patient Care Team:  Say Devries Jr., MD as PCP - General    Smoking Status:  Social History     Tobacco Use   Smoking Status Never Smoker   Smokeless Tobacco Never Used       Alcohol Consumption:  Social History     Substance and Sexual Activity   Alcohol Use No   • Frequency: Never       Depression Screen:   PHQ-2/PHQ-9 Depression Screening 11/2/2020   Little interest or pleasure in doing things 1   Feeling down, depressed, or hopeless 0   Trouble falling or staying asleep, or sleeping too much -   Feeling tired or having little energy -   Poor appetite or overeating -   Feeling bad about yourself - or that you are a failure or have let yourself or your family down -   Trouble concentrating on things, such as reading the newspaper or watching television -   Moving or speaking so slowly that other people could have noticed. Or the opposite - being so fidgety or restless that you have been moving around a lot more than usual -   Thoughts that you would be better off dead, or of hurting yourself in some way -   Total Score 1       Fall Risk Screen:  STEADI Fall Risk Assessment was completed, and patient is at MODERATE risk for falls. Assessment completed on:11/2/2020    Health Habits and Functional and Cognitive Screening:  Functional & Cognitive Status 11/2/2020   Do you have difficulty preparing food and eating? No   Do you have difficulty bathing yourself, getting dressed or grooming yourself? No    Do you have difficulty using the toilet? No   Do you have difficulty moving around from place to place? No   Do you have trouble with steps or getting out of a bed or a chair? No   Current Diet Well Balanced Diet   Dental Exam Up to date   Eye Exam Up to date   Exercise (times per week) 0 times per week   Current Exercise Activities Include None   Do you need help using the phone?  No   Are you deaf or do you have serious difficulty hearing?  Yes   Do you need help with transportation? No   Do you need help shopping? No   Do you need help preparing meals?  No   Do you need help with housework?  No   Do you need help with laundry? No   Do you need help taking your medications? No   Do you need help managing money? No   Do you ever drive or ride in a car without wearing a seat belt? No   Have you felt unusual stress, anger or loneliness in the last month? No   Who do you live with? Spouse   If you need help, do you have trouble finding someone available to you? No   Have you been bothered in the last four weeks by sexual problems? No   Do you have difficulty concentrating, remembering or making decisions? Yes         Does the patient have evidence of cognitive impairment? No    Asprin use counseling:Taking ASA appropriately as indicated    Age-appropriate Screening Schedule:  Refer to the list below for future screening recommendations based on patient's age, sex and/or medical conditions. Orders for these recommended tests are listed in the plan section. The patient has been provided with a written plan.    Health Maintenance   Topic Date Due   • DXA SCAN  08/22/2019   • INFLUENZA VACCINE  08/01/2020   • TDAP/TD VACCINES (1 - Tdap) 02/27/2021 (Originally 9/6/1954)   • LIPID PANEL  02/27/2021   • ZOSTER VACCINE  Discontinued          The following portions of the patient's history were reviewed and updated as appropriate: allergies, current medications, past family history, past medical history, past social history,  past surgical history and problem list.    Outpatient Medications Prior to Visit   Medication Sig Dispense Refill   • acetaminophen (TYLENOL) 650 MG 8 hr tablet Take 1,300 mg by mouth Daily.     • aspirin 81 MG tablet Take 81 mg by mouth Daily.     • CITALOPRAM HYDROBROMIDE PO Take 20 mg by mouth Daily.     • denosumab (PROLIA) 60 MG/ML solution prefilled syringe syringe Inject 60 mg under the skin into the appropriate area as directed.     • pantoprazole (PROTONIX) 40 MG EC tablet Take 40 mg by mouth Daily.     • simvastatin (ZOCOR) 10 MG tablet Take 10 mg by mouth Daily.     • degarelix (FIRMAGON) 120 MG chemo injection Inject  under the skin into the appropriate area as directed.     • leuprolide (ELIGARD) 22.5 MG kit injection Inject 22.5 mg under the skin into the appropriate area as directed.       No facility-administered medications prior to visit.        Patient Active Problem List   Diagnosis   • Allergic rhinitis   • Body mass index (BMI) of 22.0-22.9 in adult   • Gastroesophageal reflux disease   • Hyperlipidemia   • JAK2 V617F mutation   • Malignant neoplasm of prostate (CMS/HCC)   • Myelodysplastic syndrome (CMS/HCC)   • Osteoporosis   • Vitamin D deficiency   • Cognitive impairment   • Subclinical hypothyroidism   • Recurrent major depressive disorder, in partial remission (CMS/HCC)       Advanced Care Planning:  ACP discussion was held with the patient during this visit. Patient has an advance directive in EMR which is still valid.     Review of Systems    Compared to one year ago, the patient feels his physical health is worse.  Compared to one year ago, the patient feels his mental health is worse.    Reviewed chart for potential of high risk medication in the elderly: yes  Reviewed chart for potential of harmful drug interactions in the elderly:yes    Objective         Vitals:    11/02/20 1045   BP: 112/68   BP Location: Right arm   Patient Position: Sitting   Cuff Size: Adult   Pulse: 88   Resp:  "20   Temp: 97.1 °F (36.2 °C)   TempSrc: Infrared   SpO2: 99%   Weight: 62 kg (136 lb 9.6 oz)   Height: 167.6 cm (66\")       Body mass index is 22.05 kg/m².  Discussed the patient's BMI with him. The BMI is in the acceptable range.    Physical Exam  Vitals signs and nursing note reviewed.   Constitutional:       Appearance: He is well-developed and normal weight.   HENT:      Head: Normocephalic and atraumatic.      Nose: Nose normal.      Mouth/Throat:      Mouth: Mucous membranes are moist.      Pharynx: Oropharynx is clear.   Eyes:      Extraocular Movements: Extraocular movements intact.      Conjunctiva/sclera: Conjunctivae normal.      Pupils: Pupils are equal, round, and reactive to light.   Neck:      Musculoskeletal: Normal range of motion and neck supple.   Cardiovascular:      Rate and Rhythm: Normal rate and regular rhythm.      Heart sounds: Normal heart sounds.   Pulmonary:      Effort: Pulmonary effort is normal.      Breath sounds: Normal breath sounds.   Abdominal:      General: Abdomen is flat. Bowel sounds are normal.      Palpations: Abdomen is soft.   Musculoskeletal: Normal range of motion.   Skin:     General: Skin is warm and dry.   Neurological:      Mental Status: He is alert and oriented to person, place, and time.   Psychiatric:         Behavior: Behavior normal.         Thought Content: Thought content normal.         Judgment: Judgment normal.               Assessment/Plan   Medicare Risks and Personalized Health Plan  CMS Preventative Services Quick Reference  Advance Directive Discussion  Immunizations Discussed/Encouraged (specific immunizations; Td, Influenza, Pneumococcal 23 and Shingrix )    The above risks/problems have been discussed with the patient.  Pertinent information has been shared with the patient in the After Visit Summary.  Follow up plans and orders are seen below in the Assessment/Plan Section.    Diagnoses and all orders for this visit:    1. Encounter for annual " wellness exam in Medicare patient (Primary)    2. Hyperlipidemia, unspecified hyperlipidemia type    3. Gastroesophageal reflux disease without esophagitis    4. Osteoporosis, unspecified osteoporosis type, unspecified pathological fracture presence  -     DEXA Bone Density Axial    5. Malignant neoplasm of prostate (CMS/HCC)    6. Myelodysplastic syndrome (CMS/HCC)    7. Cognitive impairment    8. Recurrent major depressive disorder, in partial remission (CMS/HCC)      Follow Up:  Return in about 3 months (around 2/2/2021) for Recheck.     An After Visit Summary and PPPS were given to the patient.

## 2020-11-02 NOTE — PROGRESS NOTES
Subjective   Den Vinson is a 85 y.o. male.     Chief Complaint   Patient presents with   • Medicare Wellness-subsequent   • Hyperlipidemia     3 month followup   • Hypothyroidism   • Depression   • Prostate Cancer   • Anemia       HPI  Chief complaint: Depression hyperlipidemia cognitive impairment prostate cancer myelodysplastic syndrome osteoporosis gastroesophageal reflux disease    The patient is an 85-year-old white male who comes in for follow-up and maintenance of his current problems which include    1.  Depression-unchanged-the patient is on citalopram 20 mg daily.  He denies anxiousness agitation depressed or suicidal thoughts.    2.  Hyperlipidemia-stable-patient is currently on Zocor 10 mg daily.  He denies myalgias and arthralgias.  Denies nausea or anorexia.    3.  Prostate cancer-stable-patient is completed treatment with antihormonal therapy.  The patient said no evidence of recurrence or metastatic disease.  He is being followed by urology.    4.  Myelodysplastic syndrome-unchanged-patient has chronic anemia with hemoglobins in the 8 range.  Patient requires periodic blood transfusions.  Patient is being followed by hematology.  He denied fevers or chills or night sweats.  He denied lymphadenopathy.  He has lost weight.    5.  Cognitive impairment-deteriorated-patient's wife notes that the patient's memory is gradually deteriorating.  He has been evaluated by neurology who feels that the cognitive impairment is multifactorial in etiology including illness.  They are hoping that his cognitive impairment will improve once he gets off of his medications for cancer.    6.  Osteoporosis-stable-patient on Prolia.  Denied bone pain or fractures.  He has not had a bone densitometry in a while.  Is due for DEXA scan.        The following portions of the patient's history were reviewed and updated as appropriate: allergies, current medications, past family history, past medical history, past social  "history, past surgical history and problem list.    Review of Systems    Objective     /68 (BP Location: Right arm, Patient Position: Sitting, Cuff Size: Adult)   Pulse 88   Temp 97.1 °F (36.2 °C) (Infrared)   Resp 20   Ht 167.6 cm (66\")   Wt 62 kg (136 lb 9.6 oz)   SpO2 99%   BMI 22.05 kg/m²     Physical Exam  Vitals signs and nursing note reviewed.   Constitutional:       Appearance: He is well-developed and normal weight.   HENT:      Head: Normocephalic and atraumatic.      Nose: Nose normal.      Mouth/Throat:      Mouth: Mucous membranes are moist.      Pharynx: Oropharynx is clear.   Eyes:      Extraocular Movements: Extraocular movements intact.      Conjunctiva/sclera: Conjunctivae normal.      Pupils: Pupils are equal, round, and reactive to light.   Neck:      Musculoskeletal: Normal range of motion and neck supple.   Cardiovascular:      Rate and Rhythm: Normal rate and regular rhythm.      Pulses: Normal pulses.      Heart sounds: Normal heart sounds.   Pulmonary:      Effort: Pulmonary effort is normal.      Breath sounds: Normal breath sounds.   Abdominal:      General: Abdomen is flat. Bowel sounds are normal.      Palpations: Abdomen is soft.   Musculoskeletal: Normal range of motion.   Skin:     General: Skin is warm and dry.   Neurological:      Mental Status: He is alert and oriented to person, place, and time.   Psychiatric:         Behavior: Behavior normal.         Thought Content: Thought content normal.         Judgment: Judgment normal.           Assessment/Plan   Diagnoses and all orders for this visit:    1. Recurrent major depressive disorder, in partial remission (CMS/[facility]) (Primary)    2. Encounter for annual wellness exam in Medicare patient    3. Hyperlipidemia, unspecified hyperlipidemia type    4. Gastroesophageal reflux disease without esophagitis    5. Osteoporosis, unspecified osteoporosis type, unspecified pathological fracture presence  -     DEXA Bone Density " Axial    6. Malignant neoplasm of prostate (CMS/HCC)    7. Myelodysplastic syndrome (CMS/HCC)    8. Cognitive impairment      Patient Instructions   Continue your current medications and treat,ment.    Follow up in the office in 3 months.    Laboratory testing at that time.    Get the DEXA scan scan.      Say Devries Jr., MD    11/02/20

## 2020-11-02 NOTE — PATIENT INSTRUCTIONS
Continue your current medications and treat,ment.    Follow up in the office in 3 months.    Laboratory testing at that time.    Get the DEXA scan scan.